# Patient Record
Sex: MALE | Race: WHITE
[De-identification: names, ages, dates, MRNs, and addresses within clinical notes are randomized per-mention and may not be internally consistent; named-entity substitution may affect disease eponyms.]

---

## 2020-07-30 ENCOUNTER — HOSPITAL ENCOUNTER (INPATIENT)
Dept: HOSPITAL 8 - ED | Age: 40
LOS: 6 days | Discharge: HOME | DRG: 637 | End: 2020-08-05
Attending: INTERNAL MEDICINE | Admitting: HOSPITALIST
Payer: MEDICAID

## 2020-07-30 VITALS — WEIGHT: 144.18 LBS | HEIGHT: 65 IN | BODY MASS INDEX: 24.02 KG/M2

## 2020-07-30 DIAGNOSIS — G93.41: ICD-10-CM

## 2020-07-30 DIAGNOSIS — K85.20: ICD-10-CM

## 2020-07-30 DIAGNOSIS — K86.1: ICD-10-CM

## 2020-07-30 DIAGNOSIS — F10.239: ICD-10-CM

## 2020-07-30 DIAGNOSIS — I21.4: ICD-10-CM

## 2020-07-30 DIAGNOSIS — N17.0: ICD-10-CM

## 2020-07-30 DIAGNOSIS — E83.51: ICD-10-CM

## 2020-07-30 DIAGNOSIS — K92.0: ICD-10-CM

## 2020-07-30 DIAGNOSIS — E86.0: ICD-10-CM

## 2020-07-30 DIAGNOSIS — E10.10: Primary | ICD-10-CM

## 2020-07-30 DIAGNOSIS — K29.20: ICD-10-CM

## 2020-07-30 DIAGNOSIS — E87.5: ICD-10-CM

## 2020-07-30 DIAGNOSIS — D50.9: ICD-10-CM

## 2020-07-30 DIAGNOSIS — Z91.14: ICD-10-CM

## 2020-07-30 DIAGNOSIS — M62.82: ICD-10-CM

## 2020-07-30 DIAGNOSIS — E87.1: ICD-10-CM

## 2020-07-30 DIAGNOSIS — J96.01: ICD-10-CM

## 2020-07-30 LAB
ACETONE, SERUM: (no result)
ALBUMIN SERPL-MCNC: 3.1 G/DL (ref 3.4–5)
ALP SERPL-CCNC: 168 U/L (ref 45–117)
ALT SERPL-CCNC: 230 U/L (ref 12–78)
ANION GAP SERPL CALC-SCNC: 42 MMOL/L (ref 5–15)
BASOPHILS # BLD AUTO: 0.08 X10^3/UL (ref 0–0.1)
BASOPHILS NFR BLD AUTO: 1 % (ref 0–1)
BILIRUB SERPL-MCNC: 2.5 MG/DL (ref 0.2–1)
CALCIUM SERPL-MCNC: 6.6 MG/DL (ref 8.5–10.1)
CHLORIDE SERPL-SCNC: 65 MMOL/L (ref 98–107)
CK SERPL-CCNC: 382 U/L (ref 39–308)
CREAT SERPL-MCNC: 3.85 MG/DL (ref 0.7–1.3)
EOSINOPHIL # BLD AUTO: 0.01 X10^3/UL (ref 0–0.4)
EOSINOPHIL NFR BLD AUTO: 0 % (ref 1–7)
ERYTHROCYTE [DISTWIDTH] IN BLOOD BY AUTOMATED COUNT: 14.9 % (ref 9.4–14.8)
EST. AVERAGE GLUCOSE BLD GHB EST-MCNC: 214 MG/DL (ref 0–126)
INR PPP: 1.36 (ref 0.93–1.1)
LYMPHOCYTES # BLD AUTO: 0.56 X10^3/UL (ref 1–3.4)
LYMPHOCYTES NFR BLD AUTO: 7 % (ref 22–44)
MCH RBC QN AUTO: 27 PG (ref 27.5–34.5)
MCHC RBC AUTO-ENTMCNC: 30 G/DL (ref 33.2–36.2)
MCV RBC AUTO: 89.9 FL (ref 81–97)
MD: (no result)
MICROSCOPIC: (no result)
MONOCYTES # BLD AUTO: 1.51 X10^3/UL (ref 0.2–0.8)
MONOCYTES NFR BLD AUTO: 18 % (ref 2–9)
NEUTROPHILS # BLD AUTO: 6.38 X10^3/UL (ref 1.8–6.8)
NEUTROPHILS NFR BLD AUTO: 75 % (ref 42–75)
O2 FLOW: 2 L/MIN
O2 FLOW: 4 L/MIN
PLATELET # BLD AUTO: 155 X10^3/UL (ref 130–400)
PMV BLD AUTO: 10 FL (ref 7.4–10.4)
PROT SERPL-MCNC: 7 G/DL (ref 6.4–8.2)
PROTHROMBIN TIME: 14.5 SECONDS (ref 9.6–11.5)
RBC # BLD AUTO: 4.77 X10^6/UL (ref 4.38–5.82)
TROPONIN I SERPL-MCNC: 0.05 NG/ML (ref 0–0.04)

## 2020-07-30 PROCEDURE — 85018 HEMOGLOBIN: CPT

## 2020-07-30 PROCEDURE — 87040 BLOOD CULTURE FOR BACTERIA: CPT

## 2020-07-30 PROCEDURE — 76700 US EXAM ABDOM COMPLETE: CPT

## 2020-07-30 PROCEDURE — 82330 ASSAY OF CALCIUM: CPT

## 2020-07-30 PROCEDURE — 36415 COLL VENOUS BLD VENIPUNCTURE: CPT

## 2020-07-30 PROCEDURE — 82803 BLOOD GASES ANY COMBINATION: CPT

## 2020-07-30 PROCEDURE — A9537 TC99M MEBROFENIN: HCPCS

## 2020-07-30 PROCEDURE — 71045 X-RAY EXAM CHEST 1 VIEW: CPT

## 2020-07-30 PROCEDURE — 87086 URINE CULTURE/COLONY COUNT: CPT

## 2020-07-30 PROCEDURE — 85610 PROTHROMBIN TIME: CPT

## 2020-07-30 PROCEDURE — 84100 ASSAY OF PHOSPHORUS: CPT

## 2020-07-30 PROCEDURE — 82728 ASSAY OF FERRITIN: CPT

## 2020-07-30 PROCEDURE — 81001 URINALYSIS AUTO W/SCOPE: CPT

## 2020-07-30 PROCEDURE — 84484 ASSAY OF TROPONIN QUANT: CPT

## 2020-07-30 PROCEDURE — 93005 ELECTROCARDIOGRAM TRACING: CPT

## 2020-07-30 PROCEDURE — 86140 C-REACTIVE PROTEIN: CPT

## 2020-07-30 PROCEDURE — 82962 GLUCOSE BLOOD TEST: CPT

## 2020-07-30 PROCEDURE — 85025 COMPLETE CBC W/AUTO DIFF WBC: CPT

## 2020-07-30 PROCEDURE — 70450 CT HEAD/BRAIN W/O DYE: CPT

## 2020-07-30 PROCEDURE — 83036 HEMOGLOBIN GLYCOSYLATED A1C: CPT

## 2020-07-30 PROCEDURE — 83550 IRON BINDING TEST: CPT

## 2020-07-30 PROCEDURE — 83540 ASSAY OF IRON: CPT

## 2020-07-30 PROCEDURE — 80307 DRUG TEST PRSMV CHEM ANLYZR: CPT

## 2020-07-30 PROCEDURE — 85730 THROMBOPLASTIN TIME PARTIAL: CPT

## 2020-07-30 PROCEDURE — 80053 COMPREHEN METABOLIC PANEL: CPT

## 2020-07-30 PROCEDURE — 84145 PROCALCITONIN (PCT): CPT

## 2020-07-30 PROCEDURE — 83605 ASSAY OF LACTIC ACID: CPT

## 2020-07-30 PROCEDURE — 36600 WITHDRAWAL OF ARTERIAL BLOOD: CPT

## 2020-07-30 PROCEDURE — 82140 ASSAY OF AMMONIA: CPT

## 2020-07-30 PROCEDURE — 85014 HEMATOCRIT: CPT

## 2020-07-30 PROCEDURE — 85651 RBC SED RATE NONAUTOMATED: CPT

## 2020-07-30 PROCEDURE — 82550 ASSAY OF CK (CPK): CPT

## 2020-07-30 PROCEDURE — 80048 BASIC METABOLIC PNL TOTAL CA: CPT

## 2020-07-30 PROCEDURE — 80074 ACUTE HEPATITIS PANEL: CPT

## 2020-07-30 PROCEDURE — C9113 INJ PANTOPRAZOLE SODIUM, VIA: HCPCS

## 2020-07-30 PROCEDURE — 87181 SC STD AGAR DILUTION PER AGT: CPT

## 2020-07-30 PROCEDURE — 83690 ASSAY OF LIPASE: CPT

## 2020-07-30 PROCEDURE — 82010 KETONE BODYS QUAN: CPT

## 2020-07-30 PROCEDURE — 87147 CULTURE TYPE IMMUNOLOGIC: CPT

## 2020-07-30 PROCEDURE — 78227 HEPATOBIL SYST IMAGE W/DRUG: CPT

## 2020-07-30 PROCEDURE — 82947 ASSAY GLUCOSE BLOOD QUANT: CPT

## 2020-07-30 PROCEDURE — 83735 ASSAY OF MAGNESIUM: CPT

## 2020-07-30 RX ADMIN — PANTOPRAZOLE SODIUM SCH MG: 40 INJECTION, POWDER, FOR SOLUTION INTRAVENOUS at 22:26

## 2020-07-30 RX ADMIN — MORPHINE SULFATE PRN MG: 4 INJECTION INTRAVENOUS at 18:35

## 2020-07-30 RX ADMIN — METOCLOPRAMIDE ONE MG: 5 INJECTION, SOLUTION INTRAMUSCULAR; INTRAVENOUS at 19:30

## 2020-07-30 RX ADMIN — MORPHINE SULFATE PRN MG: 4 INJECTION INTRAVENOUS at 19:01

## 2020-07-30 RX ADMIN — SODIUM BICARBONATE ONE MEQ: 84 INJECTION INTRAVENOUS at 22:10

## 2020-07-30 RX ADMIN — METOCLOPRAMIDE ONE MG: 5 INJECTION, SOLUTION INTRAMUSCULAR; INTRAVENOUS at 20:33

## 2020-07-30 RX ADMIN — SODIUM CHLORIDE SCH MLS/HR: 0.9 INJECTION, SOLUTION INTRAVENOUS at 21:26

## 2020-07-30 RX ADMIN — INSULIN HUMAN PRN MLS/HR: 100 INJECTION, SOLUTION PARENTERAL at 20:02

## 2020-07-30 RX ADMIN — SODIUM BICARBONATE ONE MEQ: 84 INJECTION INTRAVENOUS at 23:00

## 2020-07-30 RX ADMIN — DEXTROSE, SODIUM CHLORIDE, AND POTASSIUM CHLORIDE SCH MLS/HR: 5; .45; .15 INJECTION INTRAVENOUS at 23:00

## 2020-07-30 NOTE — NUR
PT RESTING IN [facility] AT  FOR DRAW. PT ANOx4 AT THIS TIME. MORE EASILY 
WOKEN UP BUT STILL DROWSY. WCTM. WAITING FOR CCU BED.

## 2020-07-30 NOTE — NUR
insulin gtt started at 7.1 units/hr (patient weight 71kg-bed weight) verified 
by Aman PENNINGTON

3l of ns complete, lr administered per emar

## 2020-07-30 NOTE — NUR
Pain/tachypnea not improved with prior medications-provider made aware



Repeat fsbs after 1.5 liters remains "high"

## 2020-07-30 NOTE — NUR
FSBS RECHECK "HIGH" LAB MADE AWARE OF NEED FOR SERUM REDRAWN



ICU HOSPITAL BED ORDERED AS PATIENT TO BE A HOLD



 PROVIDER ORDER FOR VILLAGOMEZ FOR I/O'S AND INABILITY TO VOID



REPORT TO JOCELYN PENNINGTON

## 2020-07-30 NOTE — NUR
RN CONSULTED WITH GURMEET MERCHANT, INSULIN DRIP INCREASED BY 3 UNITS/HOUR DUE TO 
MINIMAL DROP IN BLOOD SUGAR. SUGAR USED 1210, DRIP CHANGE VERIFIED WITH ELIZABETH PENNINGTON. US AT BS. PT TOELRATING WELL. WCTM.

## 2020-07-30 NOTE — NUR
BIB BY JAUN FROM HOME FOR WEAKNESS X 2 WEEKS WITH COFFEE GROUND EMESIS/NO 
EATING X 5 DAYS

ALSO WITH LLQ PAIN (NO EPIGASTRIC PAIN)

PINT/DAY

EMS REPORT NORMAL VITALS EXCEPT RR 30-40 & FSBS "HIGH"

EMS PLACED PIV AND GAVE ROUGHLY 800ML OF NS AS WELL AS 4MG OF ZOFRAN

ECG ONCE ROOMED AS WELL AS FULL CARDIAC MONITOR

GOOD COLOR, NOT NAUSEATED-VSS

## 2020-07-30 NOTE — NUR
MEDICATED PER ENMAR WITH FAMOTIDINE/ADDITIONAL ZOFRAN/1MG ATIVAN (LAST DRINK 
TYSTERDAY) AND 1L NS (1L LITER OF NS FROM EMS COMPLETE)

## 2020-07-30 NOTE — NUR
when directed on need to void as bladder scan 650ml and has received 4l of 
fluids patient only able to void 75ml-sample sent

provider to be made aware shortly

## 2020-07-30 NOTE — NUR
VILLAGOMEZ PLACED, PT TOLERATED WELL. PT ANOx1 TO SELF AT THIS TIME. PT RESPIRATIONS 
SLIGHTLY FAST, SLOWED DUE TO PREVIOUS MEDICATION ADMINISTRATION. PT RESTING IN 
GURNEY, AROUSABLE TO PAIN STIMULI OR LOUD NOISES, PT WILL OPEN EYES AND THEN 
CLOSE THEM AGAIN AND BRIEFLY RESPOND. PT BLOOD SUGAR ORDERED THROUGH LAB AS 
RESULTS WERE TOO HIGH FOR GLUCOMETER. PT VSS AT THIS TIME. GURMEET DO AT BS FOR 
EVAL AND POC. WCTM. WAITING FOR CCU BED

## 2020-07-30 NOTE — NUR
Large coffe ground emesis noted-provider made aware



carolee (pearl [phone]



Medicated per emar for llq/throat pain

## 2020-07-31 VITALS — SYSTOLIC BLOOD PRESSURE: 139 MMHG | DIASTOLIC BLOOD PRESSURE: 91 MMHG

## 2020-07-31 VITALS — DIASTOLIC BLOOD PRESSURE: 87 MMHG | SYSTOLIC BLOOD PRESSURE: 139 MMHG

## 2020-07-31 LAB
% IRON SATURATION: 88 % (ref 20–55)
<PLATELET ESTIMATE>: (no result)
<PLT MORPHOLOGY>: (no result)
ALBUMIN SERPL-MCNC: 3.1 G/DL (ref 3.4–5)
ALP SERPL-CCNC: 153 U/L (ref 45–117)
ALT SERPL-CCNC: 274 U/L (ref 12–78)
ANION GAP SERPL CALC-SCNC: 11 MMOL/L (ref 5–15)
ANION GAP SERPL CALC-SCNC: 16 MMOL/L (ref 5–15)
BAND#(MANUAL): 0.33 X10^3/UL
BILIRUB DIRECT SERPL-MCNC: NORMAL MG/DL
BILIRUB SERPL-MCNC: 2.3 MG/DL (ref 0.2–1)
CALCIUM SERPL-MCNC: 7.8 MG/DL (ref 8.5–10.1)
CALCIUM SERPL-MCNC: 7.9 MG/DL (ref 8.5–10.1)
CHLORIDE SERPL-SCNC: 89 MMOL/L (ref 98–107)
CHLORIDE SERPL-SCNC: 99 MMOL/L (ref 98–107)
CREAT SERPL-MCNC: 2.03 MG/DL (ref 0.7–1.3)
CREAT SERPL-MCNC: 2.77 MG/DL (ref 0.7–1.3)
ERYTHROCYTE [DISTWIDTH] IN BLOOD BY AUTOMATED COUNT: 14.3 % (ref 9.4–14.8)
IRON LEVEL: 225 MCG/DL (ref 65–175)
LYMPH#(MANUAL): 0.11 X10^3/UL (ref 1–3.4)
LYMPHS% (MANUAL): 2 % (ref 22–44)
MCH RBC QN AUTO: 26.8 PG (ref 27.5–34.5)
MCHC RBC AUTO-ENTMCNC: 33.4 G/DL (ref 33.2–36.2)
MCV RBC AUTO: 80.3 FL (ref 81–97)
MD: YES
MONOS#(MANUAL): 0.22 X10^3/UL (ref 0.3–2.7)
MONOS% (MANUAL): 4 % (ref 2–9)
NEUTS BAND NFR BLD: 6 % (ref 0–7)
PLATELET # BLD AUTO: 83 X10^3/UL (ref 130–400)
PMV BLD AUTO: 8.4 FL (ref 7.4–10.4)
PROT SERPL-MCNC: 6.8 G/DL (ref 6.4–8.2)
RBC # BLD AUTO: 4.86 X10^6/UL (ref 4.38–5.82)
SEG#(MANUAL): 4.84 X10^3/UL (ref 1.8–6.8)
SEGS% (MANUAL): 88 % (ref 42–75)
TIBC SERPL-MCNC: 257 MCG/DL (ref 250–450)
TROPONIN I SERPL-MCNC: 0.06 NG/ML (ref 0–0.04)
TROPONIN I SERPL-MCNC: 0.06 NG/ML (ref 0–0.04)

## 2020-07-31 RX ADMIN — INSULIN GLARGINE SCH UNITS: 100 INJECTION, SOLUTION SUBCUTANEOUS at 21:35

## 2020-07-31 RX ADMIN — THIAMINE HYDROCHLORIDE SCH MLS/HR: 100 INJECTION, SOLUTION INTRAMUSCULAR; INTRAVENOUS at 21:36

## 2020-07-31 RX ADMIN — PANTOPRAZOLE SODIUM SCH MG: 40 INJECTION, POWDER, FOR SOLUTION INTRAVENOUS at 06:00

## 2020-07-31 RX ADMIN — SODIUM CHLORIDE SCH MLS/HR: 0.9 INJECTION, SOLUTION INTRAVENOUS at 06:33

## 2020-07-31 RX ADMIN — DEXTROSE, SODIUM CHLORIDE, AND POTASSIUM CHLORIDE SCH MLS/HR: 5; .45; .15 INJECTION INTRAVENOUS at 05:26

## 2020-07-31 RX ADMIN — INSULIN LISPRO SCH UNITS: 100 INJECTION, SOLUTION INTRAVENOUS; SUBCUTANEOUS at 11:26

## 2020-07-31 RX ADMIN — INSULIN HUMAN PRN MLS/HR: 100 INJECTION, SOLUTION PARENTERAL at 05:49

## 2020-07-31 RX ADMIN — INSULIN LISPRO SCH UNITS: 100 INJECTION, SOLUTION INTRAVENOUS; SUBCUTANEOUS at 21:35

## 2020-07-31 RX ADMIN — INSULIN LISPRO SCH UNITS: 100 INJECTION, SOLUTION INTRAVENOUS; SUBCUTANEOUS at 15:55

## 2020-07-31 RX ADMIN — SODIUM CHLORIDE SCH MLS/HR: 0.9 INJECTION, SOLUTION INTRAVENOUS at 07:30

## 2020-07-31 RX ADMIN — PANTOPRAZOLE SODIUM SCH MG: 40 INJECTION, POWDER, FOR SOLUTION INTRAVENOUS at 15:55

## 2020-07-31 RX ADMIN — INSULIN HUMAN PRN MLS/HR: 100 INJECTION, SOLUTION PARENTERAL at 02:32

## 2020-07-31 RX ADMIN — SODIUM CHLORIDE SCH MLS/HR: 0.9 INJECTION, SOLUTION INTRAVENOUS at 23:35

## 2020-07-31 RX ADMIN — SODIUM CHLORIDE SCH MLS/HR: 0.9 INJECTION, SOLUTION INTRAVENOUS at 01:39

## 2020-07-31 NOTE — NUR
PATIENT RESTING IN BED, TOLERATING INTERVENTIONS WELL. NO NOTED FURTHER NEEDS 
AT THIS TIME. WILL CONTINUE TO MONITOR. VITAL SIGNS STABLE.

## 2020-07-31 NOTE — NUR
PT MEDICATED PER MAR FOR PAIN. PT NOW RESTING, APPEARS COMFORTABLE. SEIZURE 
PRECAUTION IN PLACE. VSS, WCTM. WAITING FOR CCU BED.

## 2020-07-31 NOTE — NUR
With reassessment- patient remains in no acute distress

 Prior to clear liquid meal tray delivery fsbs rechecked: 288

VSS-updated on poc

## 2020-07-31 NOTE — NUR
BEDSIDE REPORT RECEIVED, CARE ASSUMED.  PT LAYING ON HOSPITAL BED, SLEEPING, 
AROUSES TO NAME.  SR PER MONITOR.  CONT WITH OXYGEN AT 5L NC.  NS AT 200MLS/HR 
AND INSULIN GTT AT 4.1 UNITS/HR INFUSING WITHOUT REDNESS/SWELLING.  VILLAGOMEZ TO 
DOWN DRAIN, CLEAR YELLOW URINE.  NO NEEDS VERBALIZED AT THIS TIME.  AWAITING 
ROOM ASSIGNMENT.

## 2020-07-31 NOTE — NUR
attempted to wean o2 (on 5l nc) room air sat to 70's with good waveform. o2 
adjusted to 3l nc- will cont to monitor

family updated on estimated poc

## 2020-07-31 NOTE — NUR
NEW ORDERS RECEIVED.  PT INSULIN GTT STOPPED.  REQUESTED HUMALOG AND LANTUS 
PENS FROM PHARMACY.  REQUEST CLEAR LIQUID DIET FROM DIETARY.

## 2020-07-31 NOTE — NUR
NO CHANGE IN INSULIN DRIP RATE. PATIENT DENIES ANY NEEDS AT THIS TIME. VITAL 
SIGNS STABLE. WILL CONTINUE TO MONITOR. IV PROTONIX GIVEN, PATIENT TOLERATED 
WELL.

## 2020-07-31 NOTE — NUR
NO CHANGE IN INSULIN DRIP PER PROTOCOL. VSS, NO NOTED NEEDS AT THIS TIME. WILL 
CONTINUE TO MONITOR.

## 2020-07-31 NOTE — NUR
medicated with 15 units of sq lispro for fsbs of 288. dose verified by jackie hawkins

 Provided with lunch meal tray. encouraged to eat

## 2020-07-31 NOTE — NUR
BEDSIDE REPORT FROM GEOVANI RN

  WITH ASSESSMENT PATIENT APPEARS WELL-VSS ON CARDIAC MONITOR

 DR. NICOLE TO BEDSIDE- VERBAL ORDERS RECEIVED TO REMOVE VILLAGOMEZ

## 2020-07-31 NOTE — NUR
REPORT CALLED TO CLYDE PENNINGTON-MADE AWARE THAT PATIENT HAS NOT VOIDED SINCE CATH 
REMOVAL



FAMILY CALLED TO UPDATE POC (AFTER PERMISSION GRANTED FROM PATIENT)

## 2020-07-31 NOTE — NUR
PATIENT ABLE TO HAVE BLOOD GLUCOSE CHECKED VIA GLUCOMETER, BLOOD SUGAR IS BELOW 
500 VALERIE. WILL CONTINUE BLOOD GLUCOSE CHECKS HOURLY VIA GLUCOMETER.

## 2020-08-01 VITALS — DIASTOLIC BLOOD PRESSURE: 76 MMHG | SYSTOLIC BLOOD PRESSURE: 128 MMHG

## 2020-08-01 VITALS — DIASTOLIC BLOOD PRESSURE: 79 MMHG | SYSTOLIC BLOOD PRESSURE: 134 MMHG

## 2020-08-01 VITALS — DIASTOLIC BLOOD PRESSURE: 78 MMHG | SYSTOLIC BLOOD PRESSURE: 120 MMHG

## 2020-08-01 VITALS — DIASTOLIC BLOOD PRESSURE: 80 MMHG | SYSTOLIC BLOOD PRESSURE: 133 MMHG

## 2020-08-01 LAB
<PLATELET ESTIMATE>: (no result)
<PLT MORPHOLOGY>: (no result)
ANION GAP SERPL CALC-SCNC: 15 MMOL/L (ref 5–15)
BAND#(MANUAL): 1.08 X10^3/UL
BILIRUB DIRECT SERPL-MCNC: NORMAL MG/DL
CALCIUM SERPL-MCNC: 8.2 MG/DL (ref 8.5–10.1)
CHLORIDE SERPL-SCNC: 100 MMOL/L (ref 98–107)
CREAT SERPL-MCNC: 0.76 MG/DL (ref 0.7–1.3)
EOS#(MANUAL): 0.14 X10^3/UL (ref 0–0.4)
EOS% (MANUAL): 2 % (ref 1–7)
ERYTHROCYTE [DISTWIDTH] IN BLOOD BY AUTOMATED COUNT: 15.3 % (ref 9.4–14.8)
LYMPH#(MANUAL): 1.44 X10^3/UL (ref 1–3.4)
LYMPHS% (MANUAL): 20 % (ref 22–44)
MCH RBC QN AUTO: 26.7 PG (ref 27.5–34.5)
MCHC RBC AUTO-ENTMCNC: 32.9 G/DL (ref 33.2–36.2)
MCV RBC AUTO: 81.1 FL (ref 81–97)
MD: YES
METAMYELOCYTES# (MANUAL): 0.14 X10^3/UL (ref 0–0)
METAMYELOCYTES% (MANUAL): 2 % (ref 0–1)
MONOS#(MANUAL): 0.14 X10^3/UL (ref 0.3–2.7)
MONOS% (MANUAL): 2 % (ref 2–9)
MYELOCYTES# (MANUAL): 0.07 X10^3/UL (ref 0–0)
MYELOCYTES% (MANUAL): 1 % (ref 0–0)
NEUTS BAND NFR BLD: 15 % (ref 0–7)
PLATELET # BLD AUTO: 86 X10^3/UL (ref 130–400)
PMV BLD AUTO: 9.9 FL (ref 7.4–10.4)
RBC # BLD AUTO: 4.49 X10^6/UL (ref 4.38–5.82)
REACTIVE LYMPHS # (MANUAL): 0.07 X10^3/UL (ref 0–0)
REACTIVE LYMPHS % (MANUAL): 1 % (ref 0–0)
SEG#(MANUAL): 4.1 X10^3/UL (ref 1.8–6.8)
SEGS% (MANUAL): 57 % (ref 42–75)

## 2020-08-01 RX ADMIN — INSULIN LISPRO SCH UNITS: 100 INJECTION, SOLUTION INTRAVENOUS; SUBCUTANEOUS at 11:00

## 2020-08-01 RX ADMIN — THIAMINE HYDROCHLORIDE SCH MLS/HR: 100 INJECTION, SOLUTION INTRAMUSCULAR; INTRAVENOUS at 22:21

## 2020-08-01 RX ADMIN — POTASSIUM CHLORIDE SCH MEQ: 20 TABLET, EXTENDED RELEASE ORAL at 11:22

## 2020-08-01 RX ADMIN — SODIUM CHLORIDE SCH MLS/HR: 0.9 INJECTION, SOLUTION INTRAVENOUS at 21:43

## 2020-08-01 RX ADMIN — OXYCODONE HYDROCHLORIDE AND ACETAMINOPHEN PRN TAB: 5; 325 TABLET ORAL at 21:43

## 2020-08-01 RX ADMIN — AMPICILLIN SODIUM AND SULBACTAM SODIUM SCH MLS/HR: 2; 1 INJECTION, POWDER, FOR SOLUTION INTRAMUSCULAR; INTRAVENOUS at 11:23

## 2020-08-01 RX ADMIN — INSULIN GLARGINE SCH UNITS: 100 INJECTION, SOLUTION SUBCUTANEOUS at 21:42

## 2020-08-01 RX ADMIN — PANTOPRAZOLE SODIUM SCH MG: 40 INJECTION, POWDER, FOR SOLUTION INTRAVENOUS at 05:24

## 2020-08-01 RX ADMIN — INSULIN GLARGINE SCH UNITS: 100 INJECTION, SOLUTION SUBCUTANEOUS at 09:00

## 2020-08-01 RX ADMIN — PANTOPRAZOLE SODIUM SCH MG: 40 INJECTION, POWDER, FOR SOLUTION INTRAVENOUS at 18:11

## 2020-08-01 RX ADMIN — AMPICILLIN SODIUM AND SULBACTAM SODIUM SCH MLS/HR: 2; 1 INJECTION, POWDER, FOR SOLUTION INTRAMUSCULAR; INTRAVENOUS at 21:42

## 2020-08-01 RX ADMIN — INSULIN LISPRO SCH UNITS: 100 INJECTION, SOLUTION INTRAVENOUS; SUBCUTANEOUS at 07:00

## 2020-08-01 RX ADMIN — INSULIN LISPRO SCH UNITS: 100 INJECTION, SOLUTION INTRAVENOUS; SUBCUTANEOUS at 21:42

## 2020-08-01 RX ADMIN — AMPICILLIN SODIUM AND SULBACTAM SODIUM SCH MLS/HR: 2; 1 INJECTION, POWDER, FOR SOLUTION INTRAMUSCULAR; INTRAVENOUS at 15:30

## 2020-08-01 RX ADMIN — SODIUM CHLORIDE SCH MLS/HR: 0.9 INJECTION, SOLUTION INTRAVENOUS at 08:00

## 2020-08-01 RX ADMIN — INSULIN LISPRO SCH UNITS: 100 INJECTION, SOLUTION INTRAVENOUS; SUBCUTANEOUS at 17:00

## 2020-08-02 VITALS — DIASTOLIC BLOOD PRESSURE: 69 MMHG | SYSTOLIC BLOOD PRESSURE: 106 MMHG

## 2020-08-02 VITALS — DIASTOLIC BLOOD PRESSURE: 73 MMHG | SYSTOLIC BLOOD PRESSURE: 115 MMHG

## 2020-08-02 VITALS — DIASTOLIC BLOOD PRESSURE: 69 MMHG | SYSTOLIC BLOOD PRESSURE: 113 MMHG

## 2020-08-02 VITALS — SYSTOLIC BLOOD PRESSURE: 115 MMHG | DIASTOLIC BLOOD PRESSURE: 74 MMHG

## 2020-08-02 RX ADMIN — OXYCODONE HYDROCHLORIDE AND ACETAMINOPHEN PRN TAB: 5; 325 TABLET ORAL at 15:42

## 2020-08-02 RX ADMIN — SODIUM CHLORIDE SCH MLS/HR: 0.9 INJECTION, SOLUTION INTRAVENOUS at 06:05

## 2020-08-02 RX ADMIN — PANTOPRAZOLE SODIUM SCH MG: 40 INJECTION, POWDER, FOR SOLUTION INTRAVENOUS at 06:04

## 2020-08-02 RX ADMIN — INSULIN LISPRO SCH UNITS: 100 INJECTION, SOLUTION INTRAVENOUS; SUBCUTANEOUS at 20:27

## 2020-08-02 RX ADMIN — OXYCODONE HYDROCHLORIDE AND ACETAMINOPHEN PRN TAB: 5; 325 TABLET ORAL at 20:11

## 2020-08-02 RX ADMIN — THIAMINE HYDROCHLORIDE SCH MLS/HR: 100 INJECTION, SOLUTION INTRAMUSCULAR; INTRAVENOUS at 22:58

## 2020-08-02 RX ADMIN — AMPICILLIN SODIUM AND SULBACTAM SODIUM SCH MLS/HR: 2; 1 INJECTION, POWDER, FOR SOLUTION INTRAMUSCULAR; INTRAVENOUS at 03:42

## 2020-08-02 RX ADMIN — AMPICILLIN SODIUM AND SULBACTAM SODIUM SCH MLS/HR: 2; 1 INJECTION, POWDER, FOR SOLUTION INTRAMUSCULAR; INTRAVENOUS at 18:03

## 2020-08-02 RX ADMIN — INSULIN LISPRO SCH UNITS: 100 INJECTION, SOLUTION INTRAVENOUS; SUBCUTANEOUS at 16:00

## 2020-08-02 RX ADMIN — POTASSIUM CHLORIDE SCH MEQ: 20 TABLET, EXTENDED RELEASE ORAL at 09:38

## 2020-08-02 RX ADMIN — INSULIN GLARGINE SCH UNITS: 100 INJECTION, SOLUTION SUBCUTANEOUS at 09:39

## 2020-08-02 RX ADMIN — INSULIN LISPRO SCH UNITS: 100 INJECTION, SOLUTION INTRAVENOUS; SUBCUTANEOUS at 07:00

## 2020-08-02 RX ADMIN — AMPICILLIN SODIUM AND SULBACTAM SODIUM SCH MLS/HR: 2; 1 INJECTION, POWDER, FOR SOLUTION INTRAMUSCULAR; INTRAVENOUS at 12:04

## 2020-08-02 RX ADMIN — PANTOPRAZOLE SODIUM SCH MG: 40 INJECTION, POWDER, FOR SOLUTION INTRAVENOUS at 16:42

## 2020-08-02 RX ADMIN — SODIUM CHLORIDE SCH MLS/HR: 0.9 INJECTION, SOLUTION INTRAVENOUS at 18:23

## 2020-08-02 RX ADMIN — INSULIN GLARGINE SCH UNITS: 100 INJECTION, SOLUTION SUBCUTANEOUS at 20:26

## 2020-08-02 RX ADMIN — INSULIN LISPRO SCH UNITS: 100 INJECTION, SOLUTION INTRAVENOUS; SUBCUTANEOUS at 11:11

## 2020-08-03 VITALS — SYSTOLIC BLOOD PRESSURE: 120 MMHG | DIASTOLIC BLOOD PRESSURE: 79 MMHG

## 2020-08-03 VITALS — DIASTOLIC BLOOD PRESSURE: 80 MMHG | SYSTOLIC BLOOD PRESSURE: 120 MMHG

## 2020-08-03 VITALS — SYSTOLIC BLOOD PRESSURE: 121 MMHG | DIASTOLIC BLOOD PRESSURE: 80 MMHG

## 2020-08-03 VITALS — SYSTOLIC BLOOD PRESSURE: 120 MMHG | DIASTOLIC BLOOD PRESSURE: 77 MMHG

## 2020-08-03 VITALS — DIASTOLIC BLOOD PRESSURE: 80 MMHG | SYSTOLIC BLOOD PRESSURE: 122 MMHG

## 2020-08-03 LAB
ANION GAP SERPL CALC-SCNC: 9 MMOL/L (ref 5–15)
BASOPHILS # BLD AUTO: 0.02 X10^3/UL (ref 0–0.1)
BASOPHILS NFR BLD AUTO: 0 % (ref 0–1)
CALCIUM SERPL-MCNC: 7.7 MG/DL (ref 8.5–10.1)
CHLORIDE SERPL-SCNC: 105 MMOL/L (ref 98–107)
CREAT SERPL-MCNC: 0.65 MG/DL (ref 0.7–1.3)
CRP SERPL-MCNC: 1.9 MG/DL (ref 0.02–0.49)
EOSINOPHIL # BLD AUTO: 0.22 X10^3/UL (ref 0–0.4)
EOSINOPHIL NFR BLD AUTO: 4 % (ref 1–7)
ERYTHROCYTE [DISTWIDTH] IN BLOOD BY AUTOMATED COUNT: 15.5 % (ref 9.4–14.8)
LYMPHOCYTES # BLD AUTO: 1.42 X10^3/UL (ref 1–3.4)
LYMPHOCYTES NFR BLD AUTO: 25 % (ref 22–44)
MCH RBC QN AUTO: 26.7 PG (ref 27.5–34.5)
MCHC RBC AUTO-ENTMCNC: 32.9 G/DL (ref 33.2–36.2)
MCV RBC AUTO: 81.2 FL (ref 81–97)
MD: NO
MONOCYTES # BLD AUTO: 0.57 X10^3/UL (ref 0.2–0.8)
MONOCYTES NFR BLD AUTO: 10 % (ref 2–9)
NEUTROPHILS # BLD AUTO: 3.44 X10^3/UL (ref 1.8–6.8)
NEUTROPHILS NFR BLD AUTO: 61 % (ref 42–75)
PLATELET # BLD AUTO: 154 X10^3/UL (ref 130–400)
PMV BLD AUTO: 8.3 FL (ref 7.4–10.4)
RBC # BLD AUTO: 4.27 X10^6/UL (ref 4.38–5.82)

## 2020-08-03 RX ADMIN — THIAMINE HYDROCHLORIDE SCH MLS/HR: 100 INJECTION, SOLUTION INTRAMUSCULAR; INTRAVENOUS at 21:38

## 2020-08-03 RX ADMIN — PANTOPRAZOLE SODIUM SCH MG: 40 INJECTION, POWDER, FOR SOLUTION INTRAVENOUS at 17:41

## 2020-08-03 RX ADMIN — INSULIN LISPRO SCH UNITS: 100 INJECTION, SOLUTION INTRAVENOUS; SUBCUTANEOUS at 21:37

## 2020-08-03 RX ADMIN — INSULIN LISPRO SCH UNITS: 100 INJECTION, SOLUTION INTRAVENOUS; SUBCUTANEOUS at 09:41

## 2020-08-03 RX ADMIN — OXYCODONE HYDROCHLORIDE AND ACETAMINOPHEN PRN TAB: 5; 325 TABLET ORAL at 06:18

## 2020-08-03 RX ADMIN — INSULIN LISPRO SCH UNITS: 100 INJECTION, SOLUTION INTRAVENOUS; SUBCUTANEOUS at 11:00

## 2020-08-03 RX ADMIN — OXYCODONE HYDROCHLORIDE AND ACETAMINOPHEN PRN TAB: 5; 325 TABLET ORAL at 18:37

## 2020-08-03 RX ADMIN — AMPICILLIN SODIUM AND SULBACTAM SODIUM SCH MLS/HR: 2; 1 INJECTION, POWDER, FOR SOLUTION INTRAMUSCULAR; INTRAVENOUS at 17:41

## 2020-08-03 RX ADMIN — SODIUM CHLORIDE SCH MLS/HR: 0.9 INJECTION, SOLUTION INTRAVENOUS at 12:30

## 2020-08-03 RX ADMIN — OXYCODONE HYDROCHLORIDE AND ACETAMINOPHEN PRN TAB: 5; 325 TABLET ORAL at 00:38

## 2020-08-03 RX ADMIN — OXYCODONE HYDROCHLORIDE AND ACETAMINOPHEN PRN TAB: 5; 325 TABLET ORAL at 14:46

## 2020-08-03 RX ADMIN — AMPICILLIN SODIUM AND SULBACTAM SODIUM SCH MLS/HR: 2; 1 INJECTION, POWDER, FOR SOLUTION INTRAMUSCULAR; INTRAVENOUS at 06:10

## 2020-08-03 RX ADMIN — POTASSIUM CHLORIDE SCH MEQ: 20 TABLET, EXTENDED RELEASE ORAL at 08:16

## 2020-08-03 RX ADMIN — OXYCODONE HYDROCHLORIDE AND ACETAMINOPHEN PRN TAB: 5; 325 TABLET ORAL at 23:08

## 2020-08-03 RX ADMIN — INSULIN GLARGINE SCH UNITS: 100 INJECTION, SOLUTION SUBCUTANEOUS at 21:37

## 2020-08-03 RX ADMIN — SODIUM CHLORIDE SCH MLS/HR: 0.9 INJECTION, SOLUTION INTRAVENOUS at 03:25

## 2020-08-03 RX ADMIN — INSULIN GLARGINE SCH UNITS: 100 INJECTION, SOLUTION SUBCUTANEOUS at 09:41

## 2020-08-03 RX ADMIN — AMPICILLIN SODIUM AND SULBACTAM SODIUM SCH MLS/HR: 2; 1 INJECTION, POWDER, FOR SOLUTION INTRAMUSCULAR; INTRAVENOUS at 12:34

## 2020-08-03 RX ADMIN — AMPICILLIN SODIUM AND SULBACTAM SODIUM SCH MLS/HR: 2; 1 INJECTION, POWDER, FOR SOLUTION INTRAMUSCULAR; INTRAVENOUS at 00:38

## 2020-08-03 RX ADMIN — PANTOPRAZOLE SODIUM SCH MG: 40 INJECTION, POWDER, FOR SOLUTION INTRAVENOUS at 06:10

## 2020-08-03 RX ADMIN — INSULIN LISPRO SCH UNITS: 100 INJECTION, SOLUTION INTRAVENOUS; SUBCUTANEOUS at 18:37

## 2020-08-04 VITALS — SYSTOLIC BLOOD PRESSURE: 119 MMHG | DIASTOLIC BLOOD PRESSURE: 79 MMHG

## 2020-08-04 VITALS — SYSTOLIC BLOOD PRESSURE: 119 MMHG | DIASTOLIC BLOOD PRESSURE: 78 MMHG

## 2020-08-04 VITALS — DIASTOLIC BLOOD PRESSURE: 75 MMHG | SYSTOLIC BLOOD PRESSURE: 121 MMHG

## 2020-08-04 LAB
ANION GAP SERPL CALC-SCNC: 8 MMOL/L (ref 5–15)
BASOPHILS # BLD AUTO: 0.02 X10^3/UL (ref 0–0.1)
BASOPHILS NFR BLD AUTO: 1 % (ref 0–1)
CALCIUM SERPL-MCNC: 8.3 MG/DL (ref 8.5–10.1)
CHLORIDE SERPL-SCNC: 102 MMOL/L (ref 98–107)
CREAT SERPL-MCNC: 0.71 MG/DL (ref 0.7–1.3)
EOSINOPHIL # BLD AUTO: 0.15 X10^3/UL (ref 0–0.4)
EOSINOPHIL NFR BLD AUTO: 3 % (ref 1–7)
ERYTHROCYTE [DISTWIDTH] IN BLOOD BY AUTOMATED COUNT: 15.5 % (ref 9.4–14.8)
ERYTHROCYTE [SEDIMENTATION RATE] IN BLOOD BY WESTERGREN METHOD: 29 MM/HR (ref 0–10)
HCT (SEDRATE): 35.9 % (ref 39.2–51.8)
LYMPHOCYTES # BLD AUTO: 1.4 X10^3/UL (ref 1–3.4)
LYMPHOCYTES NFR BLD AUTO: 28 % (ref 22–44)
MCH RBC QN AUTO: 26.7 PG (ref 27.5–34.5)
MCHC RBC AUTO-ENTMCNC: 33 G/DL (ref 33.2–36.2)
MCV RBC AUTO: 80.9 FL (ref 81–97)
MD: NO
MONOCYTES # BLD AUTO: 0.85 X10^3/UL (ref 0.2–0.8)
MONOCYTES NFR BLD AUTO: 17 % (ref 2–9)
NEUTROPHILS # BLD AUTO: 2.67 X10^3/UL (ref 1.8–6.8)
NEUTROPHILS NFR BLD AUTO: 52 % (ref 42–75)
PLATELET # BLD AUTO: 204 X10^3/UL (ref 130–400)
PMV BLD AUTO: 7.9 FL (ref 7.4–10.4)
RBC # BLD AUTO: 4.43 X10^6/UL (ref 4.38–5.82)

## 2020-08-04 RX ADMIN — AMPICILLIN SODIUM AND SULBACTAM SODIUM SCH MLS/HR: 2; 1 INJECTION, POWDER, FOR SOLUTION INTRAMUSCULAR; INTRAVENOUS at 13:33

## 2020-08-04 RX ADMIN — INSULIN GLARGINE SCH UNITS: 100 INJECTION, SOLUTION SUBCUTANEOUS at 08:23

## 2020-08-04 RX ADMIN — INSULIN LISPRO SCH UNITS: 100 INJECTION, SOLUTION INTRAVENOUS; SUBCUTANEOUS at 12:34

## 2020-08-04 RX ADMIN — INSULIN LISPRO SCH UNITS: 100 INJECTION, SOLUTION INTRAVENOUS; SUBCUTANEOUS at 16:48

## 2020-08-04 RX ADMIN — OXYCODONE HYDROCHLORIDE AND ACETAMINOPHEN PRN TAB: 5; 325 TABLET ORAL at 03:45

## 2020-08-04 RX ADMIN — OXYCODONE HYDROCHLORIDE AND ACETAMINOPHEN PRN TAB: 5; 325 TABLET ORAL at 10:11

## 2020-08-04 RX ADMIN — THIAMINE HYDROCHLORIDE SCH MLS/HR: 100 INJECTION, SOLUTION INTRAMUSCULAR; INTRAVENOUS at 22:39

## 2020-08-04 RX ADMIN — PANTOPRAZOLE SODIUM SCH MG: 40 INJECTION, POWDER, FOR SOLUTION INTRAVENOUS at 16:48

## 2020-08-04 RX ADMIN — AMPICILLIN SODIUM AND SULBACTAM SODIUM SCH MLS/HR: 2; 1 INJECTION, POWDER, FOR SOLUTION INTRAMUSCULAR; INTRAVENOUS at 00:06

## 2020-08-04 RX ADMIN — INSULIN GLARGINE SCH UNITS: 100 INJECTION, SOLUTION SUBCUTANEOUS at 19:56

## 2020-08-04 RX ADMIN — PANTOPRAZOLE SODIUM SCH MG: 40 INJECTION, POWDER, FOR SOLUTION INTRAVENOUS at 06:32

## 2020-08-04 RX ADMIN — INSULIN LISPRO SCH UNITS: 100 INJECTION, SOLUTION INTRAVENOUS; SUBCUTANEOUS at 19:56

## 2020-08-04 RX ADMIN — OXYCODONE HYDROCHLORIDE AND ACETAMINOPHEN PRN TAB: 5; 325 TABLET ORAL at 17:52

## 2020-08-04 RX ADMIN — AMPICILLIN SODIUM AND SULBACTAM SODIUM SCH MLS/HR: 2; 1 INJECTION, POWDER, FOR SOLUTION INTRAMUSCULAR; INTRAVENOUS at 06:50

## 2020-08-04 RX ADMIN — POTASSIUM CHLORIDE SCH MEQ: 20 TABLET, EXTENDED RELEASE ORAL at 08:21

## 2020-08-04 RX ADMIN — OXYCODONE HYDROCHLORIDE AND ACETAMINOPHEN PRN TAB: 5; 325 TABLET ORAL at 22:39

## 2020-08-04 RX ADMIN — INSULIN LISPRO SCH UNITS: 100 INJECTION, SOLUTION INTRAVENOUS; SUBCUTANEOUS at 08:23

## 2020-08-04 RX ADMIN — AMPICILLIN SODIUM AND SULBACTAM SODIUM SCH MLS/HR: 2; 1 INJECTION, POWDER, FOR SOLUTION INTRAMUSCULAR; INTRAVENOUS at 19:40

## 2020-08-05 VITALS — SYSTOLIC BLOOD PRESSURE: 106 MMHG | DIASTOLIC BLOOD PRESSURE: 68 MMHG

## 2020-08-05 VITALS — SYSTOLIC BLOOD PRESSURE: 97 MMHG | DIASTOLIC BLOOD PRESSURE: 60 MMHG

## 2020-08-05 VITALS — SYSTOLIC BLOOD PRESSURE: 116 MMHG | DIASTOLIC BLOOD PRESSURE: 71 MMHG

## 2020-08-05 LAB
ANION GAP SERPL CALC-SCNC: 7 MMOL/L (ref 5–15)
BASOPHILS # BLD AUTO: 0.04 X10^3/UL (ref 0–0.1)
BASOPHILS NFR BLD AUTO: 1 % (ref 0–1)
CALCIUM SERPL-MCNC: 8.7 MG/DL (ref 8.5–10.1)
CHLORIDE SERPL-SCNC: 106 MMOL/L (ref 98–107)
CREAT SERPL-MCNC: 0.7 MG/DL (ref 0.7–1.3)
EOSINOPHIL # BLD AUTO: 0.16 X10^3/UL (ref 0–0.4)
EOSINOPHIL NFR BLD AUTO: 3 % (ref 1–7)
ERYTHROCYTE [DISTWIDTH] IN BLOOD BY AUTOMATED COUNT: 16.2 % (ref 9.4–14.8)
LYMPHOCYTES # BLD AUTO: 1.47 X10^3/UL (ref 1–3.4)
LYMPHOCYTES NFR BLD AUTO: 31 % (ref 22–44)
MCH RBC QN AUTO: 26.6 PG (ref 27.5–34.5)
MCHC RBC AUTO-ENTMCNC: 32.6 G/DL (ref 33.2–36.2)
MCV RBC AUTO: 81.6 FL (ref 81–97)
MD: NO
MONOCYTES # BLD AUTO: 0.9 X10^3/UL (ref 0.2–0.8)
MONOCYTES NFR BLD AUTO: 19 % (ref 2–9)
NEUTROPHILS # BLD AUTO: 2.26 X10^3/UL (ref 1.8–6.8)
NEUTROPHILS NFR BLD AUTO: 47 % (ref 42–75)
PLATELET # BLD AUTO: 290 X10^3/UL (ref 130–400)
PMV BLD AUTO: 7.9 FL (ref 7.4–10.4)
RBC # BLD AUTO: 4.47 X10^6/UL (ref 4.38–5.82)

## 2020-08-05 RX ADMIN — AMPICILLIN SODIUM AND SULBACTAM SODIUM SCH MLS/HR: 2; 1 INJECTION, POWDER, FOR SOLUTION INTRAMUSCULAR; INTRAVENOUS at 14:30

## 2020-08-05 RX ADMIN — OXYCODONE HYDROCHLORIDE AND ACETAMINOPHEN PRN TAB: 5; 325 TABLET ORAL at 10:41

## 2020-08-05 RX ADMIN — INSULIN LISPRO SCH UNITS: 100 INJECTION, SOLUTION INTRAVENOUS; SUBCUTANEOUS at 12:38

## 2020-08-05 RX ADMIN — AMPICILLIN SODIUM AND SULBACTAM SODIUM SCH MLS/HR: 2; 1 INJECTION, POWDER, FOR SOLUTION INTRAMUSCULAR; INTRAVENOUS at 08:38

## 2020-08-05 RX ADMIN — INSULIN LISPRO SCH UNITS: 100 INJECTION, SOLUTION INTRAVENOUS; SUBCUTANEOUS at 07:00

## 2020-08-05 RX ADMIN — INSULIN GLARGINE SCH UNITS: 100 INJECTION, SOLUTION SUBCUTANEOUS at 10:41

## 2020-08-05 RX ADMIN — OXYCODONE HYDROCHLORIDE AND ACETAMINOPHEN PRN TAB: 5; 325 TABLET ORAL at 03:21

## 2020-08-05 RX ADMIN — AMPICILLIN SODIUM AND SULBACTAM SODIUM SCH MLS/HR: 2; 1 INJECTION, POWDER, FOR SOLUTION INTRAMUSCULAR; INTRAVENOUS at 01:47

## 2020-08-05 RX ADMIN — POTASSIUM CHLORIDE SCH MEQ: 20 TABLET, EXTENDED RELEASE ORAL at 10:41

## 2020-09-10 ENCOUNTER — HOSPITAL ENCOUNTER (INPATIENT)
Dept: HOSPITAL 8 - ED | Age: 40
LOS: 4 days | Discharge: HOME | DRG: 637 | End: 2020-09-14
Attending: INTERNAL MEDICINE | Admitting: INTERNAL MEDICINE
Payer: MEDICAID

## 2020-09-10 VITALS — WEIGHT: 147.49 LBS | HEIGHT: 65 IN | BODY MASS INDEX: 24.57 KG/M2

## 2020-09-10 VITALS — SYSTOLIC BLOOD PRESSURE: 154 MMHG | DIASTOLIC BLOOD PRESSURE: 79 MMHG

## 2020-09-10 DIAGNOSIS — E87.6: ICD-10-CM

## 2020-09-10 DIAGNOSIS — I25.2: ICD-10-CM
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
## 2020-09-10 DIAGNOSIS — R65.10: ICD-10-CM

## 2020-09-10 DIAGNOSIS — D64.9: ICD-10-CM

## 2020-09-10 DIAGNOSIS — K08.89: ICD-10-CM

## 2020-09-10 DIAGNOSIS — E87.1: ICD-10-CM

## 2020-09-10 DIAGNOSIS — Z79.4: ICD-10-CM

## 2020-09-10 DIAGNOSIS — N17.0: ICD-10-CM

## 2020-09-10 DIAGNOSIS — E87.0: ICD-10-CM

## 2020-09-10 DIAGNOSIS — E10.10: Primary | ICD-10-CM

## 2020-09-10 DIAGNOSIS — E86.0: ICD-10-CM

## 2020-09-10 DIAGNOSIS — F10.21: ICD-10-CM

## 2020-09-10 DIAGNOSIS — Z87.11: ICD-10-CM

## 2020-09-10 DIAGNOSIS — E86.1: ICD-10-CM

## 2020-09-10 LAB
ACETONE, SERUM: (no result)
ALBUMIN SERPL-MCNC: 4 G/DL (ref 3.4–5)
ALP SERPL-CCNC: 210 U/L (ref 45–117)
ALT SERPL-CCNC: 25 U/L (ref 12–78)
ANION GAP SERPL CALC-SCNC: 12 MMOL/L (ref 5–15)
ANION GAP SERPL CALC-SCNC: 12 MMOL/L (ref 5–15)
ANION GAP SERPL CALC-SCNC: 23 MMOL/L (ref 5–15)
ANION GAP SERPL CALC-SCNC: 26 MMOL/L (ref 5–15)
ANION GAP SERPL CALC-SCNC: 33 MMOL/L (ref 5–15)
ANION GAP SERPL CALC-SCNC: 6 MMOL/L (ref 5–15)
BASOPHILS # BLD AUTO: 0.04 X10^3/UL (ref 0–0.1)
BASOPHILS NFR BLD AUTO: 0 % (ref 0–1)
BILIRUB SERPL-MCNC: 0.6 MG/DL (ref 0.2–1)
CALCIUM SERPL-MCNC: 7.7 MG/DL (ref 8.5–10.1)
CALCIUM SERPL-MCNC: 8.2 MG/DL (ref 8.5–10.1)
CALCIUM SERPL-MCNC: 8.3 MG/DL (ref 8.5–10.1)
CALCIUM SERPL-MCNC: 8.5 MG/DL (ref 8.5–10.1)
CHLORIDE SERPL-SCNC: 110 MMOL/L (ref 98–107)
CHLORIDE SERPL-SCNC: 115 MMOL/L (ref 98–107)
CHLORIDE SERPL-SCNC: 117 MMOL/L (ref 98–107)
CHLORIDE SERPL-SCNC: 118 MMOL/L (ref 98–107)
CHLORIDE SERPL-SCNC: 118 MMOL/L (ref 98–107)
CHLORIDE SERPL-SCNC: 94 MMOL/L (ref 98–107)
CREAT SERPL-MCNC: 1.4 MG/DL (ref 0.7–1.3)
CREAT SERPL-MCNC: 1.52 MG/DL (ref 0.7–1.3)
CREAT SERPL-MCNC: 1.54 MG/DL (ref 0.7–1.3)
CREAT SERPL-MCNC: 1.87 MG/DL (ref 0.7–1.3)
CREAT SERPL-MCNC: 2.24 MG/DL (ref 0.7–1.3)
CREAT SERPL-MCNC: 2.81 MG/DL (ref 0.7–1.3)
EOSINOPHIL # BLD AUTO: 0.01 X10^3/UL (ref 0–0.4)
EOSINOPHIL NFR BLD AUTO: 0 % (ref 1–7)
ERYTHROCYTE [DISTWIDTH] IN BLOOD BY AUTOMATED COUNT: 17.1 % (ref 9.4–14.8)
EST. AVERAGE GLUCOSE BLD GHB EST-MCNC: 280 MG/DL (ref 0–126)
LYMPHOCYTES # BLD AUTO: 0.81 X10^3/UL (ref 1–3.4)
LYMPHOCYTES NFR BLD AUTO: 5 % (ref 22–44)
MCH RBC QN AUTO: 26.6 PG (ref 27.5–34.5)
MCHC RBC AUTO-ENTMCNC: 31.3 G/DL (ref 33.2–36.2)
MCV RBC AUTO: 84.8 FL (ref 81–97)
MD: (no result)
MICROSCOPIC: (no result)
MONOCYTES # BLD AUTO: 0.61 X10^3/UL (ref 0.2–0.8)
MONOCYTES NFR BLD AUTO: 4 % (ref 2–9)
NEUTROPHILS # BLD AUTO: 14.82 X10^3/UL (ref 1.8–6.8)
NEUTROPHILS NFR BLD AUTO: 91 % (ref 42–75)
PH BLDV: 6.97 PH (ref 7.32–7.42)
PLATELET # BLD AUTO: 472 X10^3/UL (ref 130–400)
PMV BLD AUTO: 11.3 FL (ref 7.4–10.4)
PROT SERPL-MCNC: 9.3 G/DL (ref 6.4–8.2)
RBC # BLD AUTO: 6 X10^6/UL (ref 4.38–5.82)
TROPONIN I SERPL-MCNC: < 0.015 NG/ML (ref 0–0.04)

## 2020-09-10 PROCEDURE — 80053 COMPREHEN METABOLIC PANEL: CPT

## 2020-09-10 PROCEDURE — 93005 ELECTROCARDIOGRAM TRACING: CPT

## 2020-09-10 PROCEDURE — 87081 CULTURE SCREEN ONLY: CPT

## 2020-09-10 PROCEDURE — 82962 GLUCOSE BLOOD TEST: CPT

## 2020-09-10 PROCEDURE — 82010 KETONE BODYS QUAN: CPT

## 2020-09-10 PROCEDURE — 85025 COMPLETE CBC W/AUTO DIFF WBC: CPT

## 2020-09-10 PROCEDURE — 83690 ASSAY OF LIPASE: CPT

## 2020-09-10 PROCEDURE — 80307 DRUG TEST PRSMV CHEM ANLYZR: CPT

## 2020-09-10 PROCEDURE — 36415 COLL VENOUS BLD VENIPUNCTURE: CPT

## 2020-09-10 PROCEDURE — 81001 URINALYSIS AUTO W/SCOPE: CPT

## 2020-09-10 PROCEDURE — 83735 ASSAY OF MAGNESIUM: CPT

## 2020-09-10 PROCEDURE — 84484 ASSAY OF TROPONIN QUANT: CPT

## 2020-09-10 PROCEDURE — 82803 BLOOD GASES ANY COMBINATION: CPT

## 2020-09-10 PROCEDURE — 83036 HEMOGLOBIN GLYCOSYLATED A1C: CPT

## 2020-09-10 PROCEDURE — 80048 BASIC METABOLIC PNL TOTAL CA: CPT

## 2020-09-10 PROCEDURE — 71045 X-RAY EXAM CHEST 1 VIEW: CPT

## 2020-09-10 RX ADMIN — DEXTROSE, SODIUM CHLORIDE, AND POTASSIUM CHLORIDE SCH MLS/HR: 5; .45; .15 INJECTION INTRAVENOUS at 03:54

## 2020-09-10 RX ADMIN — SODIUM CHLORIDE SCH MLS/HR: 0.9 INJECTION, SOLUTION INTRAVENOUS at 18:54

## 2020-09-10 RX ADMIN — SODIUM CHLORIDE SCH MLS/HR: 0.9 INJECTION, SOLUTION INTRAVENOUS at 08:13

## 2020-09-10 RX ADMIN — HEPARIN SODIUM SCH UNITS: 5000 INJECTION, SOLUTION INTRAVENOUS; SUBCUTANEOUS at 21:05

## 2020-09-10 RX ADMIN — HEPARIN SODIUM SCH UNITS: 5000 INJECTION, SOLUTION INTRAVENOUS; SUBCUTANEOUS at 11:54

## 2020-09-10 RX ADMIN — SODIUM CHLORIDE, PRESERVATIVE FREE SCH ML: 5 INJECTION INTRAVENOUS at 08:12

## 2020-09-10 RX ADMIN — FOLIC ACID SCH MG: 1 TABLET ORAL at 08:12

## 2020-09-10 RX ADMIN — SODIUM CHLORIDE SCH MLS/HR: 0.9 INJECTION, SOLUTION INTRAVENOUS at 23:54

## 2020-09-10 RX ADMIN — INSULIN HUMAN PRN MLS/HR: 100 INJECTION, SOLUTION PARENTERAL at 11:28

## 2020-09-10 RX ADMIN — SODIUM CHLORIDE, PRESERVATIVE FREE SCH ML: 5 INJECTION INTRAVENOUS at 21:00

## 2020-09-10 RX ADMIN — INSULIN HUMAN PRN MLS/HR: 100 INJECTION, SOLUTION PARENTERAL at 21:16

## 2020-09-10 RX ADMIN — SODIUM CHLORIDE SCH MLS/HR: 0.9 INJECTION, SOLUTION INTRAVENOUS at 13:49

## 2020-09-10 RX ADMIN — HEPARIN SODIUM SCH UNITS: 5000 INJECTION, SOLUTION INTRAVENOUS; SUBCUTANEOUS at 05:27

## 2020-09-10 RX ADMIN — SODIUM CHLORIDE SCH MLS/HR: 0.9 INJECTION, SOLUTION INTRAVENOUS at 03:54

## 2020-09-10 RX ADMIN — DEXTROSE, SODIUM CHLORIDE, AND POTASSIUM CHLORIDE SCH MLS/HR: 5; .45; .15 INJECTION INTRAVENOUS at 21:04

## 2020-09-10 RX ADMIN — DEXTROSE, SODIUM CHLORIDE, AND POTASSIUM CHLORIDE SCH MLS/HR: 5; .45; .15 INJECTION INTRAVENOUS at 11:48

## 2020-09-10 RX ADMIN — TRAZODONE HYDROCHLORIDE PRN MG: 50 TABLET ORAL at 21:15

## 2020-09-10 RX ADMIN — Medication SCH MG: at 08:12

## 2020-09-10 NOTE — NUR
LATE ENTRY SUMMARY NOTE: THIS PT PRESENTED TO THE ER FOR N/V AND ABD PAIN. PT 
STATE'S HE'D BEEN EXPERIENCING INCREASED THIRST FOR THE PAST COUPLE OF DAYS. HE 
STATES HE CONTROLS HIS BLOOD SUGAR WITH METFORMIN. PT HAS A HX OF DKA AND ETOH 
ABUSE. PT STATES HE NO LONGER DRINKS. PT WAS RESTING IN BED THROUGHOUT TIME IN 
ER ROOM, CALL LIGHT IN REACH, VISITOR AT BEDSIDE.

## 2020-09-10 NOTE — NUR
INTURPRETER IN USE. PT UPDATED IN POC. IV ACCESS OBTAINED. PT LAYING IN BED, 
CONNECTED TO CARDIAC MONITOR, BP AND O2 MONITORS.

## 2020-09-10 NOTE — NUR
NO DATA CHANGED IN TRIAGE, ACCIDENTLY HIT "EDIT." PT REFUSING SECOND IV START, 
STATES "JUST GIVE ME A REST." LEYDI, ICU RN GIVEN REPORT AND MADE AWARE OF 
THIS.

## 2020-09-10 NOTE — NUR
PT LAYING IN BED, CALL LIGHT IN REACH, RESPIRATIONS HAVE SLOWED, PT ASKING FOR 
ICE CHIPS, WILL ASK PROVIDER AND CONTINUE TO MONITOR.

## 2020-09-11 VITALS — DIASTOLIC BLOOD PRESSURE: 76 MMHG | SYSTOLIC BLOOD PRESSURE: 125 MMHG

## 2020-09-11 VITALS — SYSTOLIC BLOOD PRESSURE: 129 MMHG | DIASTOLIC BLOOD PRESSURE: 69 MMHG

## 2020-09-11 LAB
ANION GAP SERPL CALC-SCNC: 8 MMOL/L (ref 5–15)
BASOPHILS # BLD AUTO: 0.01 X10^3/UL (ref 0–0.1)
BASOPHILS NFR BLD AUTO: 0 % (ref 0–1)
CALCIUM SERPL-MCNC: 8.2 MG/DL (ref 8.5–10.1)
CHLORIDE SERPL-SCNC: 115 MMOL/L (ref 98–107)
CREAT SERPL-MCNC: 1.17 MG/DL (ref 0.7–1.3)
EOSINOPHIL # BLD AUTO: 0.14 X10^3/UL (ref 0–0.4)
EOSINOPHIL NFR BLD AUTO: 2 % (ref 1–7)
ERYTHROCYTE [DISTWIDTH] IN BLOOD BY AUTOMATED COUNT: 18 % (ref 9.4–14.8)
LYMPHOCYTES # BLD AUTO: 1.28 X10^3/UL (ref 1–3.4)
LYMPHOCYTES NFR BLD AUTO: 17 % (ref 22–44)
MCH RBC QN AUTO: 26.6 PG (ref 27.5–34.5)
MCHC RBC AUTO-ENTMCNC: 32.4 G/DL (ref 33.2–36.2)
MCV RBC AUTO: 82 FL (ref 81–97)
MD: NO
MONOCYTES # BLD AUTO: 0.61 X10^3/UL (ref 0.2–0.8)
MONOCYTES NFR BLD AUTO: 8 % (ref 2–9)
NEUTROPHILS # BLD AUTO: 5.56 X10^3/UL (ref 1.8–6.8)
NEUTROPHILS NFR BLD AUTO: 73 % (ref 42–75)
PLATELET # BLD AUTO: 217 X10^3/UL (ref 130–400)
PMV BLD AUTO: 9.8 FL (ref 7.4–10.4)
RBC # BLD AUTO: 4.1 X10^6/UL (ref 4.38–5.82)

## 2020-09-11 RX ADMIN — INSULIN GLARGINE SCH UNITS: 100 INJECTION, SOLUTION SUBCUTANEOUS at 21:20

## 2020-09-11 RX ADMIN — INSULIN LISPRO SCH UNITS: 100 INJECTION, SOLUTION INTRAVENOUS; SUBCUTANEOUS at 11:22

## 2020-09-11 RX ADMIN — HEPARIN SODIUM SCH UNITS: 5000 INJECTION, SOLUTION INTRAVENOUS; SUBCUTANEOUS at 11:05

## 2020-09-11 RX ADMIN — INSULIN LISPRO SCH UNITS: 100 INJECTION, SOLUTION INTRAVENOUS; SUBCUTANEOUS at 16:25

## 2020-09-11 RX ADMIN — DEXTROSE, SODIUM CHLORIDE, AND POTASSIUM CHLORIDE SCH MLS/HR: 5; .45; .15 INJECTION INTRAVENOUS at 05:07

## 2020-09-11 RX ADMIN — TRAZODONE HYDROCHLORIDE PRN MG: 50 TABLET ORAL at 21:12

## 2020-09-11 RX ADMIN — SODIUM CHLORIDE, PRESERVATIVE FREE SCH ML: 5 INJECTION INTRAVENOUS at 21:12

## 2020-09-11 RX ADMIN — HEPARIN SODIUM SCH UNITS: 5000 INJECTION, SOLUTION INTRAVENOUS; SUBCUTANEOUS at 21:13

## 2020-09-11 RX ADMIN — INSULIN LISPRO SCH UNITS: 100 INJECTION, SOLUTION INTRAVENOUS; SUBCUTANEOUS at 07:00

## 2020-09-11 RX ADMIN — HEPARIN SODIUM SCH UNITS: 5000 INJECTION, SOLUTION INTRAVENOUS; SUBCUTANEOUS at 05:08

## 2020-09-11 RX ADMIN — SODIUM CHLORIDE SCH MLS/HR: 0.9 INJECTION, SOLUTION INTRAVENOUS at 04:54

## 2020-09-11 RX ADMIN — INSULIN LISPRO SCH UNITS: 100 INJECTION, SOLUTION INTRAVENOUS; SUBCUTANEOUS at 21:22

## 2020-09-11 RX ADMIN — Medication SCH MG: at 07:23

## 2020-09-11 RX ADMIN — FOLIC ACID SCH MG: 1 TABLET ORAL at 07:23

## 2020-09-11 RX ADMIN — SODIUM CHLORIDE, PRESERVATIVE FREE SCH ML: 5 INJECTION INTRAVENOUS at 07:24

## 2020-09-12 VITALS — SYSTOLIC BLOOD PRESSURE: 115 MMHG | DIASTOLIC BLOOD PRESSURE: 71 MMHG

## 2020-09-12 VITALS — SYSTOLIC BLOOD PRESSURE: 116 MMHG | DIASTOLIC BLOOD PRESSURE: 70 MMHG

## 2020-09-12 VITALS — SYSTOLIC BLOOD PRESSURE: 130 MMHG | DIASTOLIC BLOOD PRESSURE: 76 MMHG

## 2020-09-12 VITALS — DIASTOLIC BLOOD PRESSURE: 70 MMHG | SYSTOLIC BLOOD PRESSURE: 109 MMHG

## 2020-09-12 LAB
ANION GAP SERPL CALC-SCNC: 11 MMOL/L (ref 5–15)
CALCIUM SERPL-MCNC: 8.6 MG/DL (ref 8.5–10.1)
CHLORIDE SERPL-SCNC: 104 MMOL/L (ref 98–107)
CREAT SERPL-MCNC: 0.74 MG/DL (ref 0.7–1.3)

## 2020-09-12 RX ADMIN — PANTOPRAZOLE SODIUM SCH MG: 20 TABLET, DELAYED RELEASE ORAL at 16:17

## 2020-09-12 RX ADMIN — HEPARIN SODIUM SCH UNITS: 5000 INJECTION, SOLUTION INTRAVENOUS; SUBCUTANEOUS at 03:56

## 2020-09-12 RX ADMIN — INSULIN GLARGINE SCH UNITS: 100 INJECTION, SOLUTION SUBCUTANEOUS at 22:11

## 2020-09-12 RX ADMIN — Medication SCH MG: at 07:09

## 2020-09-12 RX ADMIN — FOLIC ACID SCH MG: 1 TABLET ORAL at 07:09

## 2020-09-12 RX ADMIN — TRAZODONE HYDROCHLORIDE PRN MG: 50 TABLET ORAL at 03:57

## 2020-09-12 RX ADMIN — HEPARIN SODIUM SCH UNITS: 5000 INJECTION, SOLUTION INTRAVENOUS; SUBCUTANEOUS at 22:12

## 2020-09-12 RX ADMIN — SODIUM CHLORIDE, PRESERVATIVE FREE SCH ML: 5 INJECTION INTRAVENOUS at 07:11

## 2020-09-12 RX ADMIN — CALCIUM CARBONATE (ANTACID) CHEW TAB 500 MG PRN MG: 500 CHEW TAB at 11:27

## 2020-09-12 RX ADMIN — CALCIUM CARBONATE (ANTACID) CHEW TAB 500 MG PRN MG: 500 CHEW TAB at 04:03

## 2020-09-12 RX ADMIN — INSULIN LISPRO SCH UNITS: 100 INJECTION, SOLUTION INTRAVENOUS; SUBCUTANEOUS at 07:11

## 2020-09-12 RX ADMIN — HEPARIN SODIUM SCH UNITS: 5000 INJECTION, SOLUTION INTRAVENOUS; SUBCUTANEOUS at 11:27

## 2020-09-12 RX ADMIN — INSULIN LISPRO SCH UNITS: 100 INJECTION, SOLUTION INTRAVENOUS; SUBCUTANEOUS at 16:29

## 2020-09-12 RX ADMIN — SODIUM CHLORIDE, PRESERVATIVE FREE SCH ML: 5 INJECTION INTRAVENOUS at 22:12

## 2020-09-12 RX ADMIN — INSULIN GLARGINE SCH UNITS: 100 INJECTION, SOLUTION SUBCUTANEOUS at 07:10

## 2020-09-12 RX ADMIN — INSULIN LISPRO SCH UNITS: 100 INJECTION, SOLUTION INTRAVENOUS; SUBCUTANEOUS at 11:28

## 2020-09-12 RX ADMIN — INSULIN LISPRO SCH UNITS: 100 INJECTION, SOLUTION INTRAVENOUS; SUBCUTANEOUS at 22:11

## 2020-09-13 VITALS — SYSTOLIC BLOOD PRESSURE: 100 MMHG | DIASTOLIC BLOOD PRESSURE: 68 MMHG

## 2020-09-13 VITALS — DIASTOLIC BLOOD PRESSURE: 64 MMHG | SYSTOLIC BLOOD PRESSURE: 105 MMHG

## 2020-09-13 VITALS — SYSTOLIC BLOOD PRESSURE: 115 MMHG | DIASTOLIC BLOOD PRESSURE: 76 MMHG

## 2020-09-13 VITALS — DIASTOLIC BLOOD PRESSURE: 75 MMHG | SYSTOLIC BLOOD PRESSURE: 115 MMHG

## 2020-09-13 LAB
ANION GAP SERPL CALC-SCNC: 7 MMOL/L (ref 5–15)
CALCIUM SERPL-MCNC: 8.3 MG/DL (ref 8.5–10.1)
CHLORIDE SERPL-SCNC: 103 MMOL/L (ref 98–107)
CREAT SERPL-MCNC: 0.71 MG/DL (ref 0.7–1.3)

## 2020-09-13 RX ADMIN — CALCIUM CARBONATE (ANTACID) CHEW TAB 500 MG PRN MG: 500 CHEW TAB at 00:42

## 2020-09-13 RX ADMIN — HEPARIN SODIUM SCH UNITS: 5000 INJECTION, SOLUTION INTRAVENOUS; SUBCUTANEOUS at 22:59

## 2020-09-13 RX ADMIN — PANTOPRAZOLE SODIUM SCH MG: 20 TABLET, DELAYED RELEASE ORAL at 05:59

## 2020-09-13 RX ADMIN — INSULIN LISPRO SCH UNITS: 100 INJECTION, SOLUTION INTRAVENOUS; SUBCUTANEOUS at 08:17

## 2020-09-13 RX ADMIN — AMOXICILLIN AND CLAVULANATE POTASSIUM SCH TAB: 875; 125 TABLET, FILM COATED ORAL at 08:17

## 2020-09-13 RX ADMIN — FOLIC ACID SCH MG: 1 TABLET ORAL at 08:17

## 2020-09-13 RX ADMIN — HEPARIN SODIUM SCH UNITS: 5000 INJECTION, SOLUTION INTRAVENOUS; SUBCUTANEOUS at 16:30

## 2020-09-13 RX ADMIN — SODIUM CHLORIDE, PRESERVATIVE FREE SCH ML: 5 INJECTION INTRAVENOUS at 08:17

## 2020-09-13 RX ADMIN — INSULIN LISPRO SCH UNITS: 100 INJECTION, SOLUTION INTRAVENOUS; SUBCUTANEOUS at 16:00

## 2020-09-13 RX ADMIN — TRAZODONE HYDROCHLORIDE PRN MG: 50 TABLET ORAL at 00:42

## 2020-09-13 RX ADMIN — HEPARIN SODIUM SCH UNITS: 5000 INJECTION, SOLUTION INTRAVENOUS; SUBCUTANEOUS at 06:00

## 2020-09-13 RX ADMIN — PANTOPRAZOLE SODIUM SCH MG: 20 TABLET, DELAYED RELEASE ORAL at 16:28

## 2020-09-13 RX ADMIN — INSULIN GLARGINE SCH UNITS: 100 INJECTION, SOLUTION SUBCUTANEOUS at 23:01

## 2020-09-13 RX ADMIN — INSULIN LISPRO SCH UNITS: 100 INJECTION, SOLUTION INTRAVENOUS; SUBCUTANEOUS at 12:06

## 2020-09-13 RX ADMIN — AMOXICILLIN AND CLAVULANATE POTASSIUM SCH TAB: 875; 125 TABLET, FILM COATED ORAL at 22:59

## 2020-09-13 RX ADMIN — SODIUM CHLORIDE, PRESERVATIVE FREE SCH ML: 5 INJECTION INTRAVENOUS at 22:59

## 2020-09-13 RX ADMIN — Medication SCH MG: at 08:17

## 2020-09-13 RX ADMIN — INSULIN LISPRO SCH UNITS: 100 INJECTION, SOLUTION INTRAVENOUS; SUBCUTANEOUS at 23:00

## 2020-09-13 RX ADMIN — INSULIN GLARGINE SCH UNITS: 100 INJECTION, SOLUTION SUBCUTANEOUS at 08:30

## 2020-09-14 VITALS — SYSTOLIC BLOOD PRESSURE: 104 MMHG | DIASTOLIC BLOOD PRESSURE: 67 MMHG

## 2020-09-14 VITALS — DIASTOLIC BLOOD PRESSURE: 78 MMHG | SYSTOLIC BLOOD PRESSURE: 115 MMHG

## 2020-09-14 LAB
ANION GAP SERPL CALC-SCNC: 8 MMOL/L (ref 5–15)
CALCIUM SERPL-MCNC: 9.1 MG/DL (ref 8.5–10.1)
CHLORIDE SERPL-SCNC: 104 MMOL/L (ref 98–107)
CREAT SERPL-MCNC: 0.69 MG/DL (ref 0.7–1.3)

## 2020-09-14 RX ADMIN — AMOXICILLIN AND CLAVULANATE POTASSIUM SCH TAB: 875; 125 TABLET, FILM COATED ORAL at 09:48

## 2020-09-14 RX ADMIN — INSULIN LISPRO SCH UNITS: 100 INJECTION, SOLUTION INTRAVENOUS; SUBCUTANEOUS at 12:34

## 2020-09-14 RX ADMIN — TRAZODONE HYDROCHLORIDE PRN MG: 50 TABLET ORAL at 03:04

## 2020-09-14 RX ADMIN — INSULIN LISPRO SCH UNITS: 100 INJECTION, SOLUTION INTRAVENOUS; SUBCUTANEOUS at 09:57

## 2020-09-14 RX ADMIN — Medication SCH MG: at 09:48

## 2020-09-14 RX ADMIN — HEPARIN SODIUM SCH UNITS: 5000 INJECTION, SOLUTION INTRAVENOUS; SUBCUTANEOUS at 09:58

## 2020-09-14 RX ADMIN — FOLIC ACID SCH MG: 1 TABLET ORAL at 09:48

## 2020-09-14 RX ADMIN — INSULIN GLARGINE SCH UNITS: 100 INJECTION, SOLUTION SUBCUTANEOUS at 09:58

## 2020-09-14 RX ADMIN — PANTOPRAZOLE SODIUM SCH MG: 20 TABLET, DELAYED RELEASE ORAL at 06:25

## 2020-09-14 RX ADMIN — SODIUM CHLORIDE, PRESERVATIVE FREE SCH ML: 5 INJECTION INTRAVENOUS at 10:01

## 2021-02-02 NOTE — NUR
Is The Patient Presenting As Previously Scheduled?: Yes sodium 112, co2-5, glucose 1513

provider made aware- orders forthcoming How Severe Is Your Rash?: moderate Is This A New Presentation, Or A Follow-Up?: Rash

## 2021-04-20 ENCOUNTER — HOSPITAL ENCOUNTER (EMERGENCY)
Dept: HOSPITAL 8 - ED | Age: 41
Discharge: HOME | End: 2021-04-20
Payer: MEDICAID

## 2021-04-20 VITALS — BODY MASS INDEX: 23.1 KG/M2 | HEIGHT: 65 IN | WEIGHT: 138.67 LBS

## 2021-04-20 VITALS — SYSTOLIC BLOOD PRESSURE: 127 MMHG | DIASTOLIC BLOOD PRESSURE: 77 MMHG

## 2021-04-20 DIAGNOSIS — L50.0: Primary | ICD-10-CM

## 2021-04-20 DIAGNOSIS — E11.65: ICD-10-CM

## 2021-04-20 DIAGNOSIS — E11.10: ICD-10-CM

## 2021-04-20 PROCEDURE — 99284 EMERGENCY DEPT VISIT MOD MDM: CPT

## 2021-04-20 NOTE — NUR
PT HAS ALL OVER BODY RASH X2 DAYS FOR UNKOWN REASON. PT RESTING IN BED. PT ON 
MONITOR WITH VSS. PT MEDICATED PER MAR. PT DENIED ANY CURRENT NEEDS OR WANTS.